# Patient Record
Sex: FEMALE | Employment: UNEMPLOYED | URBAN - METROPOLITAN AREA
[De-identification: names, ages, dates, MRNs, and addresses within clinical notes are randomized per-mention and may not be internally consistent; named-entity substitution may affect disease eponyms.]

---

## 2022-05-11 ENCOUNTER — TELEPHONE (OUTPATIENT)
Dept: FAMILY MEDICINE CLINIC | Facility: CLINIC | Age: 67
End: 2022-05-11

## 2022-05-11 ENCOUNTER — HOSPITAL ENCOUNTER (EMERGENCY)
Facility: HOSPITAL | Age: 67
Discharge: LEFT BEFORE DISCHARGE | End: 2022-05-11
Attending: EMERGENCY MEDICINE
Payer: COMMERCIAL

## 2022-05-11 ENCOUNTER — OFFICE VISIT (OUTPATIENT)
Dept: FAMILY MEDICINE CLINIC | Facility: CLINIC | Age: 67
End: 2022-05-11
Payer: COMMERCIAL

## 2022-05-11 VITALS
TEMPERATURE: 99.5 F | DIASTOLIC BLOOD PRESSURE: 88 MMHG | HEART RATE: 110 BPM | SYSTOLIC BLOOD PRESSURE: 140 MMHG | OXYGEN SATURATION: 99 % | WEIGHT: 147 LBS

## 2022-05-11 VITALS
HEART RATE: 155 BPM | RESPIRATION RATE: 16 BRPM | SYSTOLIC BLOOD PRESSURE: 148 MMHG | OXYGEN SATURATION: 98 % | DIASTOLIC BLOOD PRESSURE: 92 MMHG | TEMPERATURE: 98.9 F

## 2022-05-11 DIAGNOSIS — Z78.9 SELF-CARE DEFICIT: Primary | ICD-10-CM

## 2022-05-11 DIAGNOSIS — N18.4 CHRONIC KIDNEY DISEASE, STAGE 4 (SEVERE) (HCC): ICD-10-CM

## 2022-05-11 DIAGNOSIS — E08.22 DIABETES MELLITUS DUE TO UNDERLYING CONDITION WITH STAGE 3 CHRONIC KIDNEY DISEASE, WITHOUT LONG-TERM CURRENT USE OF INSULIN, UNSPECIFIED WHETHER STAGE 3A OR 3B CKD (HCC): Primary | ICD-10-CM

## 2022-05-11 DIAGNOSIS — N18.4 ANEMIA DUE TO STAGE 4 CHRONIC KIDNEY DISEASE (HCC): ICD-10-CM

## 2022-05-11 DIAGNOSIS — M19.90 ARTHRITIS: ICD-10-CM

## 2022-05-11 DIAGNOSIS — R60.0 LOWER EXTREMITY EDEMA: ICD-10-CM

## 2022-05-11 DIAGNOSIS — I10 PRIMARY HYPERTENSION: ICD-10-CM

## 2022-05-11 DIAGNOSIS — N18.30 DIABETES MELLITUS DUE TO UNDERLYING CONDITION WITH STAGE 3 CHRONIC KIDNEY DISEASE, WITHOUT LONG-TERM CURRENT USE OF INSULIN, UNSPECIFIED WHETHER STAGE 3A OR 3B CKD (HCC): Primary | ICD-10-CM

## 2022-05-11 DIAGNOSIS — E21.3 HYPERPARATHYROIDISM (HCC): ICD-10-CM

## 2022-05-11 DIAGNOSIS — D63.1 ANEMIA DUE TO STAGE 4 CHRONIC KIDNEY DISEASE (HCC): ICD-10-CM

## 2022-05-11 DIAGNOSIS — M19.011 PRIMARY OSTEOARTHRITIS OF BOTH SHOULDERS: ICD-10-CM

## 2022-05-11 DIAGNOSIS — M1A.0720 IDIOPATHIC CHRONIC GOUT OF LEFT ANKLE WITHOUT TOPHUS: ICD-10-CM

## 2022-05-11 DIAGNOSIS — M19.012 PRIMARY OSTEOARTHRITIS OF BOTH SHOULDERS: ICD-10-CM

## 2022-05-11 PROCEDURE — 1100F PTFALLS ASSESS-DOCD GE2>/YR: CPT | Performed by: FAMILY MEDICINE

## 2022-05-11 PROCEDURE — 3288F FALL RISK ASSESSMENT DOCD: CPT | Performed by: FAMILY MEDICINE

## 2022-05-11 PROCEDURE — 1036F TOBACCO NON-USER: CPT | Performed by: FAMILY MEDICINE

## 2022-05-11 PROCEDURE — 99284 EMERGENCY DEPT VISIT MOD MDM: CPT | Performed by: EMERGENCY MEDICINE

## 2022-05-11 PROCEDURE — 3725F SCREEN DEPRESSION PERFORMED: CPT | Performed by: FAMILY MEDICINE

## 2022-05-11 PROCEDURE — 1160F RVW MEDS BY RX/DR IN RCRD: CPT | Performed by: FAMILY MEDICINE

## 2022-05-11 PROCEDURE — 99282 EMERGENCY DEPT VISIT SF MDM: CPT

## 2022-05-11 PROCEDURE — 3066F NEPHROPATHY DOC TX: CPT | Performed by: FAMILY MEDICINE

## 2022-05-11 PROCEDURE — 99204 OFFICE O/P NEW MOD 45 MIN: CPT | Performed by: FAMILY MEDICINE

## 2022-05-11 RX ORDER — FOLIC ACID 1 MG/1
TABLET ORAL DAILY
COMMUNITY

## 2022-05-11 RX ORDER — MULTIVITAMIN
1 TABLET ORAL DAILY
COMMUNITY

## 2022-05-11 RX ORDER — LISINOPRIL 20 MG/1
20 TABLET ORAL DAILY
COMMUNITY

## 2022-05-11 NOTE — PROGRESS NOTES
Assessment/Plan:  Some records reviewed  Other records for blood work will need to be obtained  Patient will have laboratory studies for chronic conditions listed below  Patient will continue with current regimen of medications and will restart meds that are on her list of meds that she is not taking  The patient will continue with walker and will have physical therapy occupational therapy to evaluate patient  Other guidance given at this time  Patient will elevate legs appropriately  Will evaluate for lower extremity edema with laboratory studies 1st and to consider Doppler studies  Patient will follow-up 6 weeks       Diagnoses and all orders for this visit:    Diabetes mellitus due to underlying condition with stage 3 chronic kidney disease, without long-term current use of insulin, unspecified whether stage 3a or 3b CKD (HCC)  -     CBC and differential; Future  -     Comprehensive metabolic panel; Future  -     Lipid panel; Future  -     TSH, 3rd generation with Free T4 reflex; Future  -     Hemoglobin A1C; Future  -     Microalbumin / creatinine urine ratio  -     Urinalysis with microscopic    Primary hypertension  -     CBC and differential; Future  -     Comprehensive metabolic panel; Future  -     Lipid panel; Future  -     TSH, 3rd generation with Free T4 reflex; Future  -     Hemoglobin A1C; Future  -     Microalbumin / creatinine urine ratio  -     Urinalysis with microscopic    Arthritis  -     CBC and differential; Future  -     Comprehensive metabolic panel; Future  -     Lipid panel; Future  -     TSH, 3rd generation with Free T4 reflex; Future  -     Hemoglobin A1C; Future  -     Microalbumin / creatinine urine ratio  -     Urinalysis with microscopic    Idiopathic chronic gout of left ankle without tophus  -     CBC and differential; Future  -     Comprehensive metabolic panel; Future  -     Lipid panel; Future  -     TSH, 3rd generation with Free T4 reflex;  Future  -     Hemoglobin A1C; Future  -     Microalbumin / creatinine urine ratio  -     Urinalysis with microscopic  -     Uric acid; Future    Lower extremity edema  -     NT-BNP PRO; Future    Chronic kidney disease, stage 4 (severe) ([facility])    Anemia due to stage 4 chronic kidney disease (HCC)    Primary osteoarthritis of both shoulders    Hyperparathyroidism ([address] )    Other orders  -     lisinopril (ZESTRIL) 20 mg tablet; Take 20 mg by mouth in the morning   -     Multiple Vitamin (multivitamin) tablet; Take 1 tablet by mouth in the morning   -     folic acid (FOLVITE) 1 mg tablet; Take by mouth daily            Subjective:        Patient ID: Dania Boggs is a 73 y o  female  Patient is here as a new patient to establish care  Past medical history surgical history allergies medications family history and social history all reviewed present time  Patient with extensive medical history which was reviewed  Patient states having blood work done roughly 1 month ago done in [place]  Patient has notice some lower extremity edema bilateral lower extremities  No new chest pain or shortness of breath or abdominal pain or change in urination or defecation  Patient otherwise feeling fairly well overall other than having a confrontation with her cousin  Patient upset at this  Patient also with history have arthritis in shoulders and gout of lower extremities  The following portions of the patient's history were reviewed and updated as appropriate: allergies, current medications, past family history, past medical history, past social history, past surgical history and problem list       Review of Systems   Constitutional: Negative  HENT: Negative  Eyes: Negative  Respiratory: Negative  Cardiovascular: Positive for leg swelling  Gastrointestinal: Negative  Endocrine: Negative  Genitourinary: Negative  Musculoskeletal: Positive for arthralgias and gait problem  Skin: Negative  Allergic/Immunologic: Negative  Hematological: Negative  Psychiatric/Behavioral: Positive for agitation  Objective:        Depression Screening and Follow-up Plan: Patient was screened for depression during today's encounter  They screened negative with a PHQ-2 score of 0  Falls Plan of Care: referral to physical therapy  Recommended assistive device to help with gait and balance  Home safety evaluation by OT recommended  /88 (BP Location: Left arm, Patient Position: Sitting, Cuff Size: Standard)   Pulse (!) 110   Temp 99 5 °F (37 5 °C) (Temporal)   Wt 66 7 kg (147 lb)   SpO2 99%          Physical Exam  Vitals and nursing note reviewed  Constitutional:       General: She is not in acute distress  Appearance: Normal appearance  She is not ill-appearing, toxic-appearing or diaphoretic  HENT:      Head: Normocephalic and atraumatic  Right Ear: Tympanic membrane, ear canal and external ear normal  There is no impacted cerumen  Left Ear: Tympanic membrane, ear canal and external ear normal  There is no impacted cerumen  Nose: Nose normal  No congestion or rhinorrhea  Eyes:      General: No scleral icterus  Right eye: No discharge  Left eye: No discharge  Extraocular Movements: Extraocular movements intact  Conjunctiva/sclera: Conjunctivae normal       Pupils: Pupils are equal, round, and reactive to light  Neck:      Vascular: No carotid bruit  Cardiovascular:      Rate and Rhythm: Normal rate and regular rhythm  Pulses: Normal pulses  Heart sounds: Normal heart sounds  No murmur heard  No friction rub  No gallop  Pulmonary:      Effort: Pulmonary effort is normal  No respiratory distress  Breath sounds: Normal breath sounds  No stridor  No wheezing, rhonchi or rales  Chest:      Chest wall: No tenderness  Musculoskeletal:         General: No swelling, tenderness, deformity or signs of injury   Normal range of motion  Cervical back: Normal range of motion and neck supple  No rigidity  No muscular tenderness  Right lower leg: Edema present  Left lower leg: Edema present  Comments: 1/4 pitting edema bilateral lower extremities  Lymphadenopathy:      Cervical: No cervical adenopathy  Skin:     General: Skin is warm and dry  Capillary Refill: Capillary refill takes less than 2 seconds  Coloration: Skin is not jaundiced  Findings: No bruising, erythema, lesion or rash  Neurological:      Mental Status: She is alert  Mental status is at baseline  Cranial Nerves: No cranial nerve deficit  Sensory: No sensory deficit  Motor: No weakness  Coordination: Coordination normal       Gait: Gait abnormal       Comments: Using walker for ambulation  Psychiatric:         Mood and Affect: Mood normal          Behavior: Behavior normal          Thought Content:  Thought content normal          Judgment: Judgment normal

## 2022-05-11 NOTE — TELEPHONE ENCOUNTER
Patient needs a referral to be sent to [facility] for PT,OT, Speech Therapy and Home health care   Fax number is [phone]

## 2022-05-12 NOTE — ED NOTES
Pt currently calling hotel to stay at for the night, pt refusing to go home w/ family that brought her        Arlette Spears RN  05/11/22 2044

## 2022-05-12 NOTE — ED NOTES
Pt stating she doesn't want to go home with family that brought her        Gabriele Nj, HOMERO  05/11/22 2042

## 2022-05-12 NOTE — ED PROVIDER NOTES
History  Chief Complaint   Patient presents with    Psychiatric Evaluation     Pt brought in by family with the complaint of the patient "acting strange" at home  Cousin present at registration states the patient has "wavering moments of intelligence" pt recently discharged from physical rehabilitation       71-year-old female presents for mental status evaluation  The patient is originally from [place]  She was just brought up by family after she was in the hospital and rehab for a few weeks  She [place] was [place] and was unable to pay her bills and also her apartment was condemned  She is currently living with a 1st cousin who apparently she has never met, this is because her daughter is in college in [place] and cannot take care of her  Reported the patient has been trying to leave this cousins residence repeatedly because she is not willing to stay with her  She thinks she can take care of herself  Patient is aware of the date time and the president  She is able to tell me what med she used to be on  She has some knowledge about medical care  She does not appear psychotic  Her judgment is lacking in insight is lacking however I believe she has competency capacity  She was at PCP today to establish care that recommended she get outpatient lab work  Reportedly the patient has been wanting to stay in a hotel  She does of money for this  She has a car and a license but her car is not insured    She wants to go home, she wants no testing  I cannot force her  She will go to a hotel evne though I attempted for a long time to convince her otherwise  I informed her of her HR being 155 and she sstates its always high  Psychiatric Evaluation  Associated symptoms: no anxiety, no chest pain, no fatigue and no headaches        Prior to Admission Medications   Prescriptions Last Dose Informant Patient Reported? Taking?    Multiple Vitamin (multivitamin) tablet Yes No   Sig: Take 1 tablet by mouth in the morning  folic acid (FOLVITE) 1 mg tablet   Yes No   Sig: Take by mouth daily   lisinopril (ZESTRIL) 20 mg tablet   Yes No   Sig: Take 20 mg by mouth in the morning  Facility-Administered Medications: None       Past Medical History:   Diagnosis Date    Arthritis     Diabetes mellitus ([facility] [address] )     Hypertension        History reviewed  No pertinent surgical history  Family History   Problem Relation Age of Onset    Diabetes Mother     Hypertension Mother     Prostate cancer Father     Arthritis Father     Hypertension Father     Diabetes Brother      I have reviewed and agree with the history as documented  E-Cigarette/Vaping    E-Cigarette Use Never User      E-Cigarette/Vaping Substances     Social History     Tobacco Use    Smoking status: Former Smoker     Types: Cigarettes    Smokeless tobacco: Never Used   Vaping Use    Vaping Use: Never used   Substance Use Topics    Alcohol use: Not Currently    Drug use: Not Currently       Review of Systems   Constitutional: Negative for chills, fatigue and fever  Eyes: Negative for photophobia and visual disturbance  Respiratory: Negative for cough and shortness of breath  Cardiovascular: Negative for chest pain, palpitations and leg swelling  Gastrointestinal: Negative for diarrhea, nausea and vomiting  Endocrine: Negative for polydipsia and polyuria  Genitourinary: Negative for decreased urine volume, difficulty urinating, dysuria and frequency  Musculoskeletal: Negative for back pain, neck pain and neck stiffness  Skin: Negative for color change and rash  Allergic/Immunologic: Negative for environmental allergies and immunocompromised state  Neurological: Negative for dizziness and headaches  Hematological: Negative for adenopathy  Does not bruise/bleed easily  Psychiatric/Behavioral: Negative for dysphoric mood  The patient is not nervous/anxious          Physical Exam  Physical Exam  Vitals and nursing note reviewed  Constitutional:       General: She is not in acute distress  Appearance: She is well-developed  She is not diaphoretic  HENT:      Head: Normocephalic and atraumatic  Nose: Nose normal    Eyes:      General: No scleral icterus  Conjunctiva/sclera: Conjunctivae normal       Pupils: Pupils are equal, round, and reactive to light  Neck:      Thyroid: No thyromegaly  Vascular: No JVD  Trachea: No tracheal deviation  Cardiovascular:      Rate and Rhythm: Normal rate and regular rhythm  Heart sounds: Normal heart sounds  No friction rub  No gallop  Pulmonary:      Effort: Pulmonary effort is normal  No respiratory distress  Breath sounds: Normal breath sounds  No wheezing or rales  Chest:      Chest wall: No tenderness  Abdominal:      General: Bowel sounds are normal  There is no distension  Palpations: Abdomen is soft  There is no mass  Tenderness: There is no abdominal tenderness  There is no guarding or rebound  Hernia: No hernia is present  Musculoskeletal:         General: No tenderness or deformity  Normal range of motion  Cervical back: Normal range of motion and neck supple  Skin:     General: Skin is warm and dry  Findings: No erythema  Neurological:      Mental Status: She is alert and oriented to person, place, and time  Cranial Nerves: No cranial nerve deficit  Coordination: Coordination normal       Deep Tendon Reflexes: Reflexes are normal and symmetric  Psychiatric:         Mood and Affect: Mood normal          Thought Content:  Thought content normal          Vital Signs  ED Triage Vitals [05/11/22 1905]   Temperature Pulse Respirations Blood Pressure SpO2   98 9 °F (37 2 °C) (!) 155 16 148/92 98 %      Temp src Heart Rate Source Patient Position - Orthostatic VS BP Location FiO2 (%)   -- -- -- Left arm --      Pain Score       --           Vitals:    05/11/22 1905   BP: 148/92   Pulse: (!) 155         Visual Acuity      ED Medications  Medications - No data to display    Diagnostic Studies  Results Reviewed     None                 No orders to display              Procedures  Procedures         ED Course               Identification of Seniors at [facility] Most Recent Value   (ISAR) Identification of Seniors at Risk    Before the illness or injury that brought you to the Emergency, did you need someone to help you on a regular basis? 1 Filed at: 05/11/2022 1909   In the last 24 hours, have you needed more help than usual? 1 Filed at: 05/11/2022 1909   Have you been hospitalized for one or more nights during the past 6 months? 0 Filed at: 05/11/2022 1909   In general, do you see well? 0 Filed at: 05/11/2022 1909   In general, do you have serious problems with your memory? 1 Filed at: 05/11/2022 1909   Do you take more than three different medications every day? 1 Filed at: 05/11/2022 1909   ISAR Score 4 Filed at: 05/11/2022 1909                      SBIRT 22yo+    Flowsheet Row Most Recent Value   SBIRT (25 yo +)    In order to provide better care to our patients, we are screening all of our patients for alcohol and drug use  Would it be okay to ask you these screening questions?  Unable to answer at this time Filed at: 05/11/2022 1916                    MDM  Number of Diagnoses or Management Options  Self-care deficit: new and requires workup      Disposition  Final diagnoses:   Self-care deficit     Time reflects when diagnosis was documented in both MDM as applicable and the Disposition within this note     Time User Action Codes Description Comment    5/11/2022  8:32 PM Jazmin Coto [R62 [id number]] Failure to thrive (child)     5/11/2022  8:32 PM Kristina Ortega [R62 [id number]] Failure to thrive (child)     5/11/2022  8:32 PM Jazmin Coto [Z78 [id number]] Self-care deficit       ED Disposition     ED Disposition   AMA    Condition   --    Date/Time   Wed May 11, 2022  8:32 PM    Comment   Date: 5/11/2022  Patient: Mercy Robertson  Admitted: 5/11/2022  7:00 PM  Attending Provider: Marva Ching DO    Mercy Robertson or her authorized caregiver has made the decision for the patient to leave the emergency department against th e advice of her attending physician  She or her authorized caregiver has been informed and understands the inherent risks, including death, disability  She or her authorized caregiver has decided to accept the responsibility for this decision  Omeat a Valrie Mcburney and all necessary parties have been advised that she may return for further evaluation or treatment  Her condition at time of discharge was stable  Mercy Robertson had current vital signs as follows:  /92 (BP Location: Lef t arm)   Pulse (!) 155   Temp 98 9 °F (37 2 °C)   Resp 16            Follow-up Information    None         Discharge Medication List as of 5/11/2022  9:18 PM      CONTINUE these medications which have NOT CHANGED    Details   folic acid (FOLVITE) 1 mg tablet Take by mouth daily, Historical Med      lisinopril (ZESTRIL) 20 mg tablet Take 20 mg by mouth in the morning , Historical Med      Multiple Vitamin (multivitamin) tablet Take 1 tablet by mouth in the morning , Historical Med             No discharge procedures on file      PDMP Review     None          ED Provider  Electronically Signed by           Marva Ching DO  05/14/22 2612

## 2022-05-13 NOTE — TELEPHONE ENCOUNTER
Left a message again for LEONARDO to call so I can issue referrals for this pt    They want PT, OT, and speech therapy but I don't have diagnoses for these services

## 2022-05-16 ENCOUNTER — TELEPHONE (OUTPATIENT)
Dept: FAMILY MEDICINE CLINIC | Facility: CLINIC | Age: 67
End: 2022-05-16

## 2022-05-16 NOTE — TELEPHONE ENCOUNTER
Yamil Servin Nurse called into office to inform Dr Dank Goyal, they are unable to make contact with patient  Family member stated " Jorden Camaranayely is staying at the [employer] Room [address] and unable to reach patient for a Follow-up"      Please review  Thank you